# Patient Record
Sex: FEMALE | Race: OTHER | NOT HISPANIC OR LATINO
[De-identification: names, ages, dates, MRNs, and addresses within clinical notes are randomized per-mention and may not be internally consistent; named-entity substitution may affect disease eponyms.]

---

## 2019-12-09 ENCOUNTER — APPOINTMENT (OUTPATIENT)
Dept: OBGYN | Facility: CLINIC | Age: 69
End: 2019-12-09
Payer: COMMERCIAL

## 2019-12-09 VITALS
HEIGHT: 61 IN | DIASTOLIC BLOOD PRESSURE: 85 MMHG | BODY MASS INDEX: 28.51 KG/M2 | WEIGHT: 151 LBS | SYSTOLIC BLOOD PRESSURE: 125 MMHG | HEART RATE: 93 BPM

## 2019-12-09 PROCEDURE — 51798 US URINE CAPACITY MEASURE: CPT

## 2019-12-09 PROCEDURE — G0101: CPT

## 2019-12-09 PROCEDURE — 99204 OFFICE O/P NEW MOD 45 MIN: CPT | Mod: 25

## 2019-12-09 NOTE — REVIEW OF SYSTEMS
[Arthralgias] : arthralgias [Eyesight Problems] : eyesight problems [Feeling Tired] : feeling tired [Joint Pain] : joint pain [Limb Pain] : limb pain [Joint Stiffness] : joint stiffness [All Other ROS] : all other reviewed systems are negative [FreeTextEntry1] : spinal stenosis

## 2019-12-09 NOTE — HISTORY OF PRESENT ILLNESS
[Uterine Prolapse] : none [Cystocele (Obstetric)] : none [Vaginal Wall Prolapse] : none [Rectal Prolapse] : none [Stress Incontinence] : rare [Urge Incontinence Of Urine] : none [Unable To Restrain Bowel Movement] : frequent [x3+] : three or more  times a night [Urinary Stream Starts And Stops] : none [Feelings Of Urinary Urgency] : none [Pain During Urination (Dysuria)] : none [Incomplete Emptying Of Bladder] : none [Urinary Frequency] : none [Urinary Tract Infection] : daily [Hematuria] : none [Constipation Obstructed Defecation] : frequent [Incomplete Emptying Of Stool] : none [Stool Visible Blood] : none [Diarrhea] : none [Pelvic Pain] : none [Vaginal Pain] : none [Back Pain] : none [Vulvar Pain] : none [Bladder Pain] : none [Rectal Pain] : none [Sexual Dysfunction, NOS] : none [] : none [FreeTextEntry1] : 69-year-old para 4-0-1-1 3 last.  Many years ago presents for routine gynecologic care but also with severe overactive bladder and urinary urgency frequency with some nocturia and enuresis.  She can void up to 4 times or more per evening and does restrict fluids after dinner.  Her last Pap was at least 37 years ago.  She has no bleeding or discharge.  She is constipated has seen Dr. Hoff has diverticulosis and had a polyp removed in 2014 and she is due to go back for repeat colonoscopy she does take MiraLAX several times a week to alleviate constipation and she has a very good diet with a lot of vegetables.  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 No pacemaker.

## 2019-12-09 NOTE — PHYSICAL EXAM
[Well developed] : well developed [No Acute Distress] : in no acute distress [Well Nourished] : ~L well nourished [Oriented x3] : oriented to person, place, and time [Good Hygeine] : demonstrates good hygeine [Normal Memory] : ~T memory was ~L unimpaired [Normal Mood/Affect] : mood and affect are normal [Normal Lung Sounds] : the lungs were clear to auscultation [Respirations regular] : ~T respiratory rate was regular [Rate & Rhythm Regular] : ~T heart rate and rhythm were normal [No Edema] : ~T edema was not present [Supple] : ~T the neck demonstrated no ~M decrease in suppleness [Thyroid Normal] : the thyroid ~T showed no abnormalities [Symmetrical] : the neck was ~L symmetrical [Warm and Dry] : was warm and dry to touch [Turgor Normal] : skin turgor ~T was normal [Normal Gait] : gait was normal [No Joint Swelling] : there was no joint swelling [No Clubbing, Cyanosis] : no clubbing or cyanosis of the fingernails [Vulvar Atrophy] : vulvar atrophy [Normal Strength/Tone] : muscle strength and tone were normal [Labia Minora] : were normal [Labia Majora] : were normal [Bartholin's Gland] : both Bartholin's glands were normal  [Normal Appearance] : general appearance was normal [Atrophy] : atrophy [No Bleeding] : there was no active vaginal bleeding [4] : 4 [] : 0 [Uterine Adnexae] : were not tender and not enlarged [Normal rectal exam] : was normal [Normal] : was normal [None] : no [Anxiety] : patient is not anxious [Tracheal Deviation] : no tracheal deviation observed [Mass] : no breast mass [Mass] : no ~M [unfilled] neck mass was observed [Tender] : no tenderness [Nipple Discharge] : no nipple discharge [Mass (___ Cm)] : no ~M [unfilled] abdominal mass was palpated [Distended] : not distended [Tenderness] : ~T no ~M abdominal tenderness observed [H/Smegaly] : no hepatosplenomegaly [Indicated?] : was not indicated for this patient [Hernia] : no hernia observed [Axillary LAD] : no adenopathy was noted in axillary nodes [Supraclavicular LAD] : no adenopathy noted in supraclavicular lymph nodes [Performed?] : was not performed [Estrogen Effect] : no estrogen effect was observed [Rash/Lesion] : no rash or lesion was noted [Inguinal LAD] : no adenopathy was noted in the inguinal lymph nodes

## 2019-12-09 NOTE — COUNSELING
[FreeTextEntry1] : 69-year-old para 4-0-1-1 3 last menstrual period many years ago last Pap at least 37 years ago presents for routine gynecologic care as well as diagnosis and treatment of urgency frequency and urge incontinence.  She has spinal stenosis and some exposed nerves as well and is followed by an orthopedist.  Choices for her overactive bladder include doing nothing, taking medication, PTNS, and physical therapy both for her back and her pelvic floor.  She chooses the latter and may add PTNS at some point later on.  Literature was provided and everything was explained to the patient using computer modeling and verbal explanation.\par #1 colonoscopy is up-to-date and she is probably due to have it at some point early in 2020 and she will talk to her gastroenterologist\par #2 mammogram she had had this done has fibrocystic disease no masses were felt today and she will send me a copy of the mammogram which was done recently\par #3 renal and pelvic ultrasound were ordered to ensure that there is no mass in the pelvis adnexa or kidneys impacting on the above.\par #4 PVR is normal at 0 she empties her bladder well urine was sent for culture cytology and urinalysis\par #5 bone density is being ordered as it has been quite a while since she had this done\par #6 she is up-to-date with her primary care physician and gets her eyes checked regularly\par #7 all well woman health maintenance parameters were discussed including vaccines she has had her flu shot for this year and her pneumonia shot she does need to have her shingles vaccines there are 2 doses and she is aware of that\par #8 all questions answered to the patient's apparent satisfaction and follow-up appointment was made for 6 months and if she does need to see me sooner for PTNS she will call my cell and we will set up those appointments\par #9 it was explained to the patient that although she may decide to do physical therapy separately from the PTNS doing them closely sequentially or simultaneously may give her a synergistic effect on her symptoms of incontinence.  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

## 2019-12-09 NOTE — OB HISTORY
[Total Preg ___] : : [unfilled] [Full Term ___] : [unfilled] (full-term) [Premature ___] : [unfilled] (premature) [Abortions ___] : [unfilled] (abortions) [Living ___] : [unfilled] (living) [unknown] : the patient is unsure of the date of her LMP [Last Pap Smear ___] : date of last pap smear was on [unfilled] [Pain During [place] At Entry] : pain during intercourse at entry [Abnormal Pap Smear] : normal pap smear [Abnormal Bleeding] : without bleeding [Taking Estrogens] : is not taking estrogen replacement [Sexually Active] : is not sexually active

## 2019-12-10 LAB
APPEARANCE: CLEAR
BACTERIA: NEGATIVE
BILIRUBIN URINE: NEGATIVE
BLOOD URINE: NEGATIVE
COLOR: ABNORMAL
GLUCOSE QUALITATIVE U: NEGATIVE
HPV HIGH+LOW RISK DNA PNL CVX: NOT DETECTED
HYALINE CASTS: 0 /LPF
KETONES URINE: NORMAL
LEUKOCYTE ESTERASE URINE: NEGATIVE
MICROSCOPIC-UA: NORMAL
NITRITE URINE: NEGATIVE
PH URINE: 5.5
PROTEIN URINE: NEGATIVE
RED BLOOD CELLS URINE: 2 /HPF
SPECIFIC GRAVITY URINE: 1.02
SQUAMOUS EPITHELIAL CELLS: 1 /HPF
URINE CYTOLOGY: NORMAL
UROBILINOGEN URINE: NORMAL
WHITE BLOOD CELLS URINE: 1 /HPF

## 2019-12-11 LAB — BACTERIA UR CULT: NORMAL

## 2019-12-13 LAB — CYTOLOGY CVX/VAG DOC THIN PREP: ABNORMAL

## 2020-08-21 ENCOUNTER — APPOINTMENT (OUTPATIENT)
Dept: OBGYN | Facility: CLINIC | Age: 70
End: 2020-08-21

## 2020-10-21 ENCOUNTER — APPOINTMENT (OUTPATIENT)
Dept: GASTROENTEROLOGY | Facility: CLINIC | Age: 70
End: 2020-10-21
Payer: COMMERCIAL

## 2020-10-21 VITALS
OXYGEN SATURATION: 96 % | HEART RATE: 84 BPM | WEIGHT: 152 LBS | TEMPERATURE: 98.6 F | DIASTOLIC BLOOD PRESSURE: 78 MMHG | SYSTOLIC BLOOD PRESSURE: 116 MMHG | RESPIRATION RATE: 16 BRPM | BODY MASS INDEX: 28.7 KG/M2 | HEIGHT: 61 IN

## 2020-10-21 DIAGNOSIS — Z12.11 ENCOUNTER FOR SCREENING FOR MALIGNANT NEOPLASM OF COLON: ICD-10-CM

## 2020-10-21 DIAGNOSIS — K59.00 CONSTIPATION, UNSPECIFIED: ICD-10-CM

## 2020-10-21 DIAGNOSIS — R10.9 UNSPECIFIED ABDOMINAL PAIN: ICD-10-CM

## 2020-10-21 PROCEDURE — 99203 OFFICE O/P NEW LOW 30 MIN: CPT

## 2020-10-21 RX ORDER — SODIUM SULFATE, POTASSIUM SULFATE, MAGNESIUM SULFATE 17.5; 3.13; 1.6 G/ML; G/ML; G/ML
17.5-3.13-1.6 SOLUTION, CONCENTRATE ORAL
Qty: 1 | Refills: 0 | Status: ACTIVE | COMMUNITY
Start: 2020-10-21 | End: 1900-01-01

## 2020-10-21 NOTE — HISTORY OF PRESENT ILLNESS
[FreeTextEntry1] : This is a pleasant 70-year-old female with history hypertension presenting for surveillance colonoscopy. She underwent a colonoscopy in 2014 and was found to have a benign colon polyp. She has a history of chronic constipation For which she takes MiraLax as needed. Lately she's been requiring MiraLax approximately 5 days a week with response. However she's also been experiencing right lower quadrant abdominal pain. Colonoscopy from 2016 with left-sided diverticulosis. She denies rectal bleeding or melena. She denies weight loss or anemia. She denies family history of colon cancer or colon polyps. She relates that there are times where she would perform vaginal splinting in order to have bowel movements. She is aware that there may be a possibility of a vaginocele or rectocele but prefers not to have further testing. I explained to her that if this gets worse, she may need further testing with MRI of the pelvis and possible surgical repair. She is aware and wants to hold off.

## 2020-10-21 NOTE — ASSESSMENT
[FreeTextEntry1] : This is a 70-year-old female with history of chronic constipation recently with abdominal pain in the right lower quadrant. She at times performs vaginal splinting to produce bowel movements. There is a possibility of vaginocele or rectocele but she wants to hold off on any further testing including an MRI. She is to avoid constipation by taking MiraLax on a regular basis.I recommend increasing her water intake. She admits that she does not drink enough water.  I recommend a colonoscopy. I explained to her The risks, alternatives and benefits to a colonoscopy. This including but not limited to bleeding, perforation, infection adverse medication reaction. Questions were answered. She stated understanding.

## 2020-10-21 NOTE — REASON FOR VISIT
[Initial Evaluation] : an initial evaluation [FreeTextEntry1] : abdominal pain, constipation, hx of colon polyp

## 2020-10-23 ENCOUNTER — APPOINTMENT (OUTPATIENT)
Dept: DISASTER EMERGENCY | Facility: CLINIC | Age: 70
End: 2020-10-23

## 2020-10-23 DIAGNOSIS — Z01.818 ENCOUNTER FOR OTHER PREPROCEDURAL EXAMINATION: ICD-10-CM

## 2020-10-24 LAB — SARS-COV-2 N GENE NPH QL NAA+PROBE: NOT DETECTED

## 2020-10-27 ENCOUNTER — APPOINTMENT (OUTPATIENT)
Dept: GASTROENTEROLOGY | Facility: AMBULATORY MEDICAL SERVICES | Age: 70
End: 2020-10-27
Payer: COMMERCIAL

## 2020-10-27 PROCEDURE — 45378 DIAGNOSTIC COLONOSCOPY: CPT | Mod: 33

## 2021-02-05 ENCOUNTER — APPOINTMENT (OUTPATIENT)
Dept: OBGYN | Facility: CLINIC | Age: 71
End: 2021-02-05
Payer: COMMERCIAL

## 2021-02-05 VITALS
BODY MASS INDEX: 27.94 KG/M2 | HEART RATE: 91 BPM | WEIGHT: 148 LBS | SYSTOLIC BLOOD PRESSURE: 123 MMHG | HEIGHT: 61 IN | DIASTOLIC BLOOD PRESSURE: 83 MMHG

## 2021-02-05 DIAGNOSIS — N39.41 URGE INCONTINENCE: ICD-10-CM

## 2021-02-05 PROCEDURE — 51798 US URINE CAPACITY MEASURE: CPT

## 2021-02-05 PROCEDURE — 99072 ADDL SUPL MATRL&STAF TM PHE: CPT

## 2021-02-05 PROCEDURE — 99214 OFFICE O/P EST MOD 30 MIN: CPT | Mod: 25

## 2021-02-05 NOTE — REVIEW OF SYSTEMS
[Feeling Tired] : feeling tired [Eyesight Problems] : eyesight problems [Arthralgias] : arthralgias [Joint Pain] : joint pain [Joint Stiffness] : joint stiffness [Limb Pain] : limb pain [All Other ROS] : all other reviewed systems are negative [FreeTextEntry1] : spinal stenosis

## 2021-02-05 NOTE — COUNSELING
[FreeTextEntry1] : 70-year-old para 4-0-1-1 3 last menstrual period many years ago last Pap 1-1/2 years ago presents for routine gynecologic care as well as follow-up and treatment of urgency frequency and urge incontinence.  She has spinal stenosis and some exposed nerves as well and is followed by an orthopedist.  Choices for her overactive bladder include doing nothing, taking medication, PTNS, and physical therapy both for her back and her pelvic floor.  She chooses the latter and may add PTNS at some point later on.  Currently she is doing pelvic floor muscle exercise on her own doing very well with an improvement in prolapse and urgency incontinence.  Literature was provided and everything was explained to the patient using computer modeling and verbal explanation.\par #1 colonoscopy is up-to-date and she will have this repeated in 10 years according to the patient and Dr. 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 Esparza and according to the patient declined an oral bisphosphonate which she took several years ago she also declined Prolia and I have asked her to rediscuss this with him as this is severe osteoporosis bone density will be ordered in the fall 2022 for comparison and she agrees to speak to him about the risks benefits and alternatives and can also be asked to refer to an endocrinologist and the patient is aware of this\par #6 she is up-to-date with her primary care physician and gets her eyes checked regularly\par #7 all [place] woman health maintenance parameters were discussed including vaccines she has had her flu shot for this year and her pneumonia shot she does need to have her shingles vaccines there are 2 doses and she is aware of that-she will have these in April after her cataract surgery\par #8 all questions answered to the patient's apparent satisfaction and follow-up appointment was made for 12 months and if she does need to see me sooner for PTNS she will call my cell and we will set up those appointments\par #9 it was explained to the patient that although she may decide to do physical therapy separately from the PTNS doing them closely sequentially or simultaneously may give her a synergistic effect on her symptoms of incontinence.  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

## 2021-02-05 NOTE — CHIEF COMPLAINT
[Questionnaire Received] : Patient questionnaire received [Urine Frequency] : urine frequency [Poor Bladder Control] : poor bladder control [Poor/Slow Urine Flow] : poor/slow urine flow

## 2021-02-05 NOTE — HISTORY OF PRESENT ILLNESS
[Cystocele (Obstetric)] : mild [Uterine Prolapse] : no [Vaginal Wall Prolapse] : no [Rectal Prolapse] : no [Stress Incontinence] : no [Urge Incontinence Of Urine] : no [Unable To Restrain Bowel Movement] : mild [x1] : nocturia once nightly [Urinary Stream Starts And Stops] : no [Incomplete Emptying Of Bladder] : no [Urinary Frequency] : no [Feelings Of Urinary Urgency] : no [Pain During Urination (Dysuria)] : no [Urinary Tract Infection] : no [Hematuria] : no [Uses ___ pads per day] : uses [unfilled] pad(s) per day [Constipation Obstructed Defecation] : mild [Incomplete Emptying Of Stool] : no [Stool Visible Blood] : mild [Diarrhea] : no [Pelvic Pain] : no [Vaginal Pain] : no [Vulvar Pain] : no [Bladder Pain] : no [Rectal Pain] : no [Back Pain] : no [Sexual Dysfunction, NOS] : no [] : no [FreeTextEntry1] : 69-year-old para 4-0-1-1 3 last.  Many years ago presents for routine gynecologic care but also with severe overactive bladder and urinary urgency frequency with some nocturia and enuresis.  She can void up to 4 times or more per evening and does restrict fluids after dinner.  Her last Pap was at least 37 years ago.  She has no bleeding or discharge.  She is constipated has seen Dr. Hoff has diverticulosis and had a polyp removed in 2014 and she is due to go back for repeat colonoscopy she does take MiraLAX several times a week to alleviate constipation and she has a very good diet with a lot of vegetables.  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 No pacemaker.\par \par \par February 5, 2021 70-year-old para 4-0-1-1 3 presents for GYN checkup slightly overdue.  She has moderate overactive bladder urgency frequency with some nocturia and enuresis improved through Kegel exercises from 2019.  She is restricting her fluid after dinner and only gets up once per evening.  Her last Pap was 2019 in the fall.  She has no bleeding or discharge she her colonoscopy was last year with Dr. Hoff she is cleared for another 10 years there were no polyps present.  Her diet is good and she is taking calcium and vitamin D for osteoporosis declining any oral or intravenous or intramuscular or nasal treatment for osteoporosis.  Good family support and she is getting her second Covid vaccine in 2 weeks.  She is scheduled for cataract repair March 17 and her shingles vaccine in April she did not have her pelvic sono as ordered at the last visit and this is being ordered to be done before her cataract surgery March 17 and she agrees to do this.  I called [facility] and they have no record of a pelvic sono.

## 2021-02-05 NOTE — PHYSICAL EXAM
[Chaperone Present] : A chaperone was present in the examining room during all aspects of the physical examination [No Acute Distress] : in no acute distress [Well developed] : well developed [Well Nourished] : ~L well nourished [Good Hygeine] : demonstrates good hygeine [Oriented x3] : oriented to person, place, and time [Normal Memory] : ~T memory was ~L unimpaired [Normal Mood/Affect] : mood and affect are normal [Normal Lung Sounds] : the lungs were clear to auscultation [Respirations regular] : ~T respiratory rate was regular [Rate & Rhythm Regular] : ~T heart rate and rhythm were normal [No Edema] : ~T edema was not present [Supple] : ~T the neck demonstrated no ~M decrease in suppleness [Thyroid Normal] : the thyroid ~T showed no abnormalities [Symmetrical] : the neck was ~L symmetrical [Warm and Dry] : was warm and dry to touch [Turgor Normal] : skin turgor ~T was normal [Normal Gait] : gait was normal [No Joint Swelling] : there was no joint swelling [No Clubbing, Cyanosis] : no clubbing or cyanosis of the fingernails [Normal Strength/Tone] : muscle strength and tone were normal [Vulvar Atrophy] : vulvar atrophy [Labia Majora] : were normal [Labia Minora] : were normal [Bartholin's Gland] : both Bartholin's glands were normal  [Normal Appearance] : general appearance was normal [Atrophy] : atrophy [No Bleeding] : there was no active vaginal bleeding [4] : 4 [] : I [Uterine Adnexae] : were not tender and not enlarged [Normal rectal exam] : was normal [Normal] : was normal [None] : no [Anxiety] : patient is not anxious [Tracheal Deviation] : no tracheal deviation observed [Mass] : no ~M [unfilled] neck mass was observed [Mass] : no breast mass [Tender] : no tenderness [Nipple Discharge] : no nipple discharge [Mass (___ Cm)] : no ~M [unfilled] abdominal mass was palpated [Tenderness] : ~T no ~M abdominal tenderness observed [Distended] : not distended [H/Smegaly] : no hepatosplenomegaly [Hernia] : no hernia observed [Indicated?] : was not indicated for this patient [Performed?] : was not performed [Supraclavicular LAD] : no adenopathy noted in supraclavicular lymph nodes [Axillary LAD] : no adenopathy was noted in axillary nodes [Inguinal LAD] : no adenopathy was noted in the inguinal lymph nodes [Rash/Lesion] : no rash or lesion was noted [Estrogen Effect] : no estrogen effect was observed

## 2021-02-05 NOTE — OB HISTORY
[Total Preg ___] : : [unfilled] [Full Term ___] : [unfilled] (full-term) [Premature ___] : [unfilled] (premature) [Abortions ___] : [unfilled] (abortions) [Living ___] : [unfilled] (living) [unknown] : the patient is unsure of the date of her LMP [Last Pap Smear ___] : date of last pap smear was on [unfilled] [Pain During [place] At Entry] : pain during intercourse at entry [Abnormal Pap Smear] : normal pap smear [Taking Estrogens] : is not taking estrogen replacement [Abnormal Bleeding] : without bleeding [Sexually Active] : is not sexually active

## 2021-02-06 LAB
APPEARANCE: CLEAR
BACTERIA: NEGATIVE
BILIRUBIN URINE: NEGATIVE
BLOOD URINE: NEGATIVE
COLOR: NORMAL
GLUCOSE QUALITATIVE U: NEGATIVE
HYALINE CASTS: 0 /LPF
KETONES URINE: NORMAL
LEUKOCYTE ESTERASE URINE: NEGATIVE
MICROSCOPIC-UA: NORMAL
NITRITE URINE: NEGATIVE
PH URINE: 6
PROTEIN URINE: NEGATIVE
RED BLOOD CELLS URINE: 1 /HPF
SPECIFIC GRAVITY URINE: 1.02
SQUAMOUS EPITHELIAL CELLS: 0 /HPF
UROBILINOGEN URINE: NORMAL
WHITE BLOOD CELLS URINE: 0 /HPF

## 2021-02-07 LAB — BACTERIA UR CULT: NORMAL

## 2021-02-08 LAB — URINE CYTOLOGY: NORMAL

## 2021-02-23 ENCOUNTER — NON-APPOINTMENT (OUTPATIENT)
Age: 71
End: 2021-02-23

## 2021-03-10 ENCOUNTER — NON-APPOINTMENT (OUTPATIENT)
Age: 71
End: 2021-03-10

## 2022-02-07 ENCOUNTER — APPOINTMENT (OUTPATIENT)
Dept: OBGYN | Facility: CLINIC | Age: 72
End: 2022-02-07

## 2022-05-02 ENCOUNTER — APPOINTMENT (OUTPATIENT)
Dept: OBGYN | Facility: CLINIC | Age: 72
End: 2022-05-02
Payer: COMMERCIAL

## 2022-05-02 VITALS — HEART RATE: 89 BPM | SYSTOLIC BLOOD PRESSURE: 129 MMHG | HEIGHT: 61 IN | DIASTOLIC BLOOD PRESSURE: 82 MMHG

## 2022-05-02 DIAGNOSIS — N32.81 OVERACTIVE BLADDER: ICD-10-CM

## 2022-05-02 DIAGNOSIS — R92.2 INCONCLUSIVE MAMMOGRAM: ICD-10-CM

## 2022-05-02 DIAGNOSIS — N95.2 POSTMENOPAUSAL ATROPHIC VAGINITIS: ICD-10-CM

## 2022-05-02 DIAGNOSIS — N81.9 FEMALE GENITAL PROLAPSE, UNSPECIFIED: ICD-10-CM

## 2022-05-02 DIAGNOSIS — Z00.00 ENCOUNTER FOR GENERAL ADULT MEDICAL EXAMINATION W/OUT ABNORMAL FINDINGS: ICD-10-CM

## 2022-05-02 PROCEDURE — 51798 US URINE CAPACITY MEASURE: CPT

## 2022-05-02 PROCEDURE — 99213 OFFICE O/P EST LOW 20 MIN: CPT | Mod: 25

## 2022-05-02 RX ORDER — TRAZODONE HYDROCHLORIDE 50 MG/1
50 TABLET ORAL
Refills: 0 | Status: ACTIVE | COMMUNITY
Start: 2022-05-02

## 2022-05-02 RX ORDER — ALENDRONATE SODIUM 70 MG/1
70 TABLET ORAL
Refills: 6 | Status: ACTIVE | COMMUNITY
Start: 2022-05-02

## 2022-05-02 NOTE — HISTORY OF PRESENT ILLNESS
[FreeTextEntry1] : 69-year-old para 4-0-1-1 3 last.  Many years ago presents for routine gynecologic care but also with severe overactive bladder and urinary urgency frequency with some nocturia and enuresis.  She can void up to 4 times or more per evening and does restrict fluids after dinner.  Her last Pap was at least 37 years ago.  She has no bleeding or discharge.  She is constipated has seen Dr. Vences has diverticulosis and had a polyp removed in 2014 and she is due to go back for repeat colonoscopy she does take MiraLAX several times a week to alleviate constipation and she has a very good diet with a lot of vegetables.  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 No pacemaker.\par \par \par February 5, 2021 70-year-old para 4-0-1-1 3 presents for GYN checkup slightly overdue.  She has moderate overactive bladder urgency frequency with some nocturia and enuresis improved through Kegel exercises from 2019.  She is restricting her fluid after dinner and only gets up once per evening.  Her last Pap was 2019 in the fall.  She has no bleeding or discharge she her colonoscopy was last year with Dr. Vences she is cleared for another 10 years there were no polyps present.  Her diet is good and she is taking calcium and vitamin D for osteoporosis declining any oral or intravenous or intramuscular or nasal treatment for osteoporosis.  Good family support and she is getting her second Covid vaccine in 2 weeks.  She is scheduled for cataract repair March 17 and her shingles vaccine in April she did not have her pelvic sono as ordered at the last visit and this is being ordered to be done before her cataract surgery March 17 and she agrees to do this.  I called [facility] and they have no record of a pelvic sono.\par \par 5/2/2022\par PT COMES IN FOR ANNUAL VISIT; OAB SOMEWHAT BOTHERSOME HOWEVER ARTHRITIS AND OSTEOPOROSIS IS OVERSHADOWING OAB (WHICH SHE MANAGES WITH TIMED VOIDING). PFME ON HER OWN; HAS NOT FOLLOWED UP WITH FORMAL PT NOR PTNS. FINDS THAT SHE IS DOING OK ON TIMED VOIDING AND PFME ON HER OWN.\par PCP DR JESSICA GIBBS IN [place].\par OSTEOPOROSIS: DEXA THIS FALL, ON FOSAMAX\par ARTHRITIS SEVERE-TRAMDOL (DR GATICA-PAIN MANAGEMENT MD IN [place])\par CATARACTS BOTH DONE NOW-L EYE LAST WEEK R EYE 2021\par LESION ON SPINE PER PT (FOUND ON MRI)-WORKUP NOW IS PROGRESS (2 X 3 CM)-PT SEEING DR GATICA THIS THURSDAY AND WILL FOLLOW UP\par MAMMOGRAM NEG 2022\par SONO RENAL AND PELVIS RE-ORDERED TODAY\par UA UC CYTOL SENT\par COLO UP TO DATE KIAN VENCES-RPT <80\par PAP HPV SENT TODAY\par EXAM UNREMARKABLE OTHERWISE AND PVR NL\par FOLLOW UP IN ONE YEAR OR SOONER AS CLINICALLY INDICATED\par JMR

## 2022-05-02 NOTE — PHYSICAL EXAM
[Anxiety] : patient is not anxious [Tracheal Deviation] : no tracheal deviation observed [Mass] : no ~M [unfilled] neck mass was observed [Mass] : no breast mass [Tender] : no tenderness [Nipple Discharge] : no nipple discharge [Mass (___ Cm)] : no ~M [unfilled] abdominal mass was palpated [Tenderness] : ~T no ~M abdominal tenderness observed [Distended] : not distended [H/Smegaly] : no hepatosplenomegaly [Hernia] : no hernia observed [Indicated?] : was not indicated for this patient [Performed?] : was not performed [Supraclavicular LAD] : no adenopathy noted in supraclavicular lymph nodes [Axillary LAD] : no adenopathy was noted in axillary nodes [Inguinal LAD] : no adenopathy was noted in the inguinal lymph nodes [Rash/Lesion] : no rash or lesion was noted [Estrogen Effect] : no estrogen effect was observed

## 2022-05-03 LAB
APPEARANCE: CLEAR
BACTERIA: NEGATIVE
BILIRUBIN URINE: NEGATIVE
BLOOD URINE: NEGATIVE
COLOR: YELLOW
GLUCOSE QUALITATIVE U: NEGATIVE
HPV HIGH+LOW RISK DNA PNL CVX: NOT DETECTED
HYALINE CASTS: 1 /LPF
KETONES URINE: NEGATIVE
LEUKOCYTE ESTERASE URINE: NEGATIVE
MICROSCOPIC-UA: NORMAL
NITRITE URINE: NEGATIVE
PH URINE: 6
PROTEIN URINE: NORMAL
RED BLOOD CELLS URINE: 2 /HPF
SPECIFIC GRAVITY URINE: 1.03
SQUAMOUS EPITHELIAL CELLS: 0 /HPF
UROBILINOGEN URINE: NORMAL
WHITE BLOOD CELLS URINE: 1 /HPF

## 2022-05-04 LAB
BACTERIA UR CULT: NORMAL
URINE CYTOLOGY: NORMAL

## 2022-05-06 LAB — CYTOLOGY CVX/VAG DOC THIN PREP: ABNORMAL

## 2022-08-23 ENCOUNTER — NON-APPOINTMENT (OUTPATIENT)
Age: 72
End: 2022-08-23

## 2022-08-25 ENCOUNTER — NON-APPOINTMENT (OUTPATIENT)
Age: 72
End: 2022-08-25

## 2022-08-25 DIAGNOSIS — Z13.820 ENCOUNTER FOR SCREENING FOR OSTEOPOROSIS: ICD-10-CM

## 2022-10-31 ENCOUNTER — NON-APPOINTMENT (OUTPATIENT)
Age: 72
End: 2022-10-31

## 2022-11-01 ENCOUNTER — NON-APPOINTMENT (OUTPATIENT)
Age: 72
End: 2022-11-01

## 2023-05-15 ENCOUNTER — APPOINTMENT (OUTPATIENT)
Dept: OBGYN | Facility: CLINIC | Age: 73
End: 2023-05-15

## 2023-10-20 NOTE — COUNSELING
Left message from provider on voicemail of phone number provided   [FreeTextEntry1] : 70-year-old para 4-0-1-1 3 last menstrual period many years ago last Pap 1-1/2 years ago presents for routine gynecologic care as well as follow-up and treatment of urgency frequency and urge incontinence.  She has spinal stenosis and some exposed nerves as well and is followed by an orthopedist.  Choices for her overactive bladder include doing nothing, taking medication, PTNS, and physical therapy both for her back and her pelvic floor.  She chooses the latter and may add PTNS at some point later on.  Currently she is doing pelvic floor muscle exercise on her own doing very well with an improvement in prolapse and urgency incontinence.  Literature was provided and everything was explained to the patient using computer modeling and verbal explanation.\par -5/2/2022: update:\par \par 5/2/2022\par PT COMES IN FOR ANNUAL VISIT; OAB SOMEWHAT BOTHERSOME HOWEVER ARTHRITIS AND OSTEOPOROSIS IS OVERSHADOWING OAB (WHICH SHE MANAGES WITH TIMED VOIDING). PFME ON HER OWN; HAS NOT FOLLOWED UP WITH FORMAL PT NOR PTNS. FINDS THAT SHE IS DOING OK ON TIMED VOIDING AND PFME ON HER OWN.\par PCP DR JESSICA GIBBS IN [place].\par OSTEOPOROSIS: DEXA THIS FALL, ON FOSAMAX\par ARTHRITIS SEVERE-TRAMDOL (DR GATICA-PAIN MANAGEMENT MD IN CT)\par CATARACTS BOTH DONE NOW-L EYE LAST WEEK R EYE 2021\par LESION ON SPINE PER PT (FOUND ON MRI)-WORKUP NOW IS PROGRESS (2 X 3 CM)-PT SEEING DR GATICA THIS THURSDAY AND WILL FOLLOW UP\par MAMMOGRAM NEG 2022\par SONO RENAL AND PELVIS RE-ORDERED TODAY\par UA UC CYTOL SENT\par COLO UP TO DATE KIAN VENCES-RPT <80\par PAP HPV SENT TODAY\par EXAM UNREMARKABLE OTHERWISE AND PVR NL\par FOLLOW UP IN ONE YEAR OR SOONER AS CLINICALLY INDICATED\par JMR\par #1 colonoscopy is up-to-date and she will have this repeated in 10 years according to the patient and Dr. 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 Gibbs and according to the patient declined an oral bisphosphonate which she took several years ago she also declined Prolia and I have asked her to rediscuss this with him as this is severe osteoporosis bone density will be ordered in the fall 2022 for comparison and she agrees to speak to him about the risks benefits and alternatives and can also be asked to refer to an endocrinologist and the patient is aware of this\par #6 she is up-to-date with her primary care physician and gets her eyes checked regularly\par #7 all well woman health maintenance parameters were discussed including vaccines she has had her flu shot for this year and her pneumonia shot she does need to have her shingles vaccines there are 2 doses and she is aware of that-she will have these in April after her cataract surgery\par #8 all questions answered to the patient's apparent satisfaction and follow-up appointment was made for 12 months and if she does need to see me sooner for PTNS she will call my cell and we will set up those appointments\par #9 it was explained to the patient that although she may decide to do physical therapy separately from the PTNS doing them closely sequentially or simultaneously may give her a synergistic effect on her symptoms of incontinence.  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